# Patient Record
Sex: FEMALE
[De-identification: names, ages, dates, MRNs, and addresses within clinical notes are randomized per-mention and may not be internally consistent; named-entity substitution may affect disease eponyms.]

---

## 2019-07-11 ENCOUNTER — LABORATORY RESULT (OUTPATIENT)
Age: 69
End: 2019-07-11

## 2019-07-12 ENCOUNTER — NON-APPOINTMENT (OUTPATIENT)
Age: 69
End: 2019-07-12

## 2019-07-12 ENCOUNTER — APPOINTMENT (OUTPATIENT)
Dept: INTERNAL MEDICINE | Facility: CLINIC | Age: 69
End: 2019-07-12
Payer: COMMERCIAL

## 2019-07-12 VITALS
SYSTOLIC BLOOD PRESSURE: 106 MMHG | HEART RATE: 100 BPM | HEIGHT: 64.57 IN | BODY MASS INDEX: 18.55 KG/M2 | DIASTOLIC BLOOD PRESSURE: 68 MMHG | TEMPERATURE: 98.6 F | OXYGEN SATURATION: 98 % | WEIGHT: 110 LBS

## 2019-07-12 DIAGNOSIS — Z80.0 FAMILY HISTORY OF MALIGNANT NEOPLASM OF DIGESTIVE ORGANS: ICD-10-CM

## 2019-07-12 DIAGNOSIS — R42 DIZZINESS AND GIDDINESS: ICD-10-CM

## 2019-07-12 DIAGNOSIS — Z80.1 FAMILY HISTORY OF MALIGNANT NEOPLASM OF TRACHEA, BRONCHUS AND LUNG: ICD-10-CM

## 2019-07-12 DIAGNOSIS — Z87.891 PERSONAL HISTORY OF NICOTINE DEPENDENCE: ICD-10-CM

## 2019-07-12 DIAGNOSIS — F41.9 ANXIETY DISORDER, UNSPECIFIED: ICD-10-CM

## 2019-07-12 DIAGNOSIS — Z00.00 ENCOUNTER FOR GENERAL ADULT MEDICAL EXAMINATION W/OUT ABNORMAL FINDINGS: ICD-10-CM

## 2019-07-12 DIAGNOSIS — Z80.8 FAMILY HISTORY OF MALIGNANT NEOPLASM OF OTHER ORGANS OR SYSTEMS: ICD-10-CM

## 2019-07-12 PROCEDURE — 99387 INIT PM E/M NEW PAT 65+ YRS: CPT | Mod: 25

## 2019-07-12 PROCEDURE — 93000 ELECTROCARDIOGRAM COMPLETE: CPT

## 2019-07-12 PROCEDURE — 36415 COLL VENOUS BLD VENIPUNCTURE: CPT

## 2019-07-12 RX ORDER — ALPRAZOLAM 1 MG/1
1 TABLET ORAL
Refills: 0 | Status: ACTIVE | COMMUNITY
Start: 2019-07-12

## 2019-07-15 ENCOUNTER — OTHER (OUTPATIENT)
Age: 69
End: 2019-07-15

## 2019-07-15 LAB
25(OH)D3 SERPL-MCNC: 25.3 NG/ML
ALBUMIN SERPL ELPH-MCNC: 4.9 G/DL
ALP BLD-CCNC: 67 U/L
ALT SERPL-CCNC: 8 U/L
ANION GAP SERPL CALC-SCNC: 10 MMOL/L
APPEARANCE: CLEAR
AST SERPL-CCNC: 16 U/L
BASOPHILS # BLD AUTO: 0.03 K/UL
BASOPHILS NFR BLD AUTO: 0.5 %
BILIRUB SERPL-MCNC: 0.3 MG/DL
BILIRUBIN URINE: NEGATIVE
BLOOD URINE: ABNORMAL
BUN SERPL-MCNC: 19 MG/DL
CALCIUM SERPL-MCNC: 9.8 MG/DL
CHLORIDE SERPL-SCNC: 104 MMOL/L
CHOLEST SERPL-MCNC: 220 MG/DL
CHOLEST/HDLC SERPL: 2.4 RATIO
CO2 SERPL-SCNC: 29 MMOL/L
COLOR: YELLOW
CREAT SERPL-MCNC: 0.83 MG/DL
CRP SERPL-MCNC: <0.1 MG/DL
EOSINOPHIL # BLD AUTO: 0.1 K/UL
EOSINOPHIL NFR BLD AUTO: 1.7 %
ERYTHROCYTE [SEDIMENTATION RATE] IN BLOOD BY WESTERGREN METHOD: 2 MM/HR
ESTIMATED AVERAGE GLUCOSE: 103 MG/DL
GLUCOSE QUALITATIVE U: NEGATIVE
GLUCOSE SERPL-MCNC: 98 MG/DL
HBA1C MFR BLD HPLC: 5.2 %
HCT VFR BLD CALC: 39.3 %
HCV AB SER QL: NONREACTIVE
HCV S/CO RATIO: 0.13 S/CO
HDLC SERPL-MCNC: 90 MG/DL
HGB BLD-MCNC: 12.3 G/DL
IMM GRANULOCYTES NFR BLD AUTO: 0.2 %
KETONES URINE: NEGATIVE
LDLC SERPL CALC-MCNC: 115 MG/DL
LEUKOCYTE ESTERASE URINE: NEGATIVE
LYMPHOCYTES # BLD AUTO: 1.7 K/UL
LYMPHOCYTES NFR BLD AUTO: 29.7 %
MAN DIFF?: NORMAL
MCHC RBC-ENTMCNC: 31.3 GM/DL
MCHC RBC-ENTMCNC: 34.9 PG
MCV RBC AUTO: 111.6 FL
MONOCYTES # BLD AUTO: 0.48 K/UL
MONOCYTES NFR BLD AUTO: 8.4 %
NEUTROPHILS # BLD AUTO: 3.41 K/UL
NEUTROPHILS NFR BLD AUTO: 59.5 %
NITRITE URINE: NEGATIVE
PH URINE: 6
PLATELET # BLD AUTO: 270 K/UL
POTASSIUM SERPL-SCNC: 4.4 MMOL/L
PROT SERPL-MCNC: 6.9 G/DL
PROTEIN URINE: NORMAL
RBC # BLD: 3.52 M/UL
RBC # FLD: 14.2 %
RHEUMATOID FACT SER QL: <10 IU/ML
SODIUM SERPL-SCNC: 143 MMOL/L
SPECIFIC GRAVITY URINE: 1.03
TRIGL SERPL-MCNC: 77 MG/DL
UROBILINOGEN URINE: NORMAL
WBC # FLD AUTO: 5.73 K/UL

## 2019-07-16 LAB
ANA PAT FLD IF-IMP: ABNORMAL
ANA SER IF-ACNC: ABNORMAL

## 2023-04-12 ENCOUNTER — APPOINTMENT (OUTPATIENT)
Dept: NEUROLOGY | Facility: CLINIC | Age: 73
End: 2023-04-12
Payer: MEDICARE

## 2023-04-12 VITALS
BODY MASS INDEX: 19.81 KG/M2 | TEMPERATURE: 97.7 F | WEIGHT: 116 LBS | RESPIRATION RATE: 17 BRPM | SYSTOLIC BLOOD PRESSURE: 119 MMHG | HEIGHT: 64 IN | HEART RATE: 97 BPM | OXYGEN SATURATION: 99 % | DIASTOLIC BLOOD PRESSURE: 87 MMHG

## 2023-04-12 DIAGNOSIS — Z12.11 ENCOUNTER FOR SCREENING FOR MALIGNANT NEOPLASM OF COLON: ICD-10-CM

## 2023-04-12 DIAGNOSIS — Z13.220 ENCOUNTER FOR SCREENING FOR LIPOID DISORDERS: ICD-10-CM

## 2023-04-12 DIAGNOSIS — Z12.39 ENCOUNTER FOR OTHER SCREENING FOR MALIGNANT NEOPLASM OF BREAST: ICD-10-CM

## 2023-04-12 DIAGNOSIS — R07.89 OTHER CHEST PAIN: ICD-10-CM

## 2023-04-12 DIAGNOSIS — R91.8 OTHER NONSPECIFIC ABNORMAL FINDING OF LUNG FIELD: ICD-10-CM

## 2023-04-12 DIAGNOSIS — Z91.89 OTHER SPECIFIED PERSONAL RISK FACTORS, NOT ELSEWHERE CLASSIFIED: ICD-10-CM

## 2023-04-12 DIAGNOSIS — Z23 ENCOUNTER FOR IMMUNIZATION: ICD-10-CM

## 2023-04-12 DIAGNOSIS — M54.9 DORSALGIA, UNSPECIFIED: ICD-10-CM

## 2023-04-12 DIAGNOSIS — Z13.1 ENCOUNTER FOR SCREENING FOR DIABETES MELLITUS: ICD-10-CM

## 2023-04-12 PROCEDURE — 99204 OFFICE O/P NEW MOD 45 MIN: CPT

## 2023-04-12 NOTE — ASSESSMENT
[FreeTextEntry1] : 72-year-old woman with episodic sensation of movement/rocking when she is standing or walking.  I agree this is consistent with the description of mal de debarquement, ans is not consistent with BPPV or an intracranial lesion.  I would like to obtain a CT scan of the temporal bones to look for superior canal dehiscence, as this was not done after her prior MRI showing asymmetric superior canals.  If the CT is unremarkable, will recommend consultation with neuro-otologist Le Green at [facility].

## 2023-04-12 NOTE — PHYSICAL EXAM
[FreeTextEntry1] : Physical Exam\par Constitutional: no apparent distress\par Psychiatric: normal affect, euthymic, alert and oriented x 3\par \par Neurologic Exam:\par Mental Status: awake and alert, speech fluent and prosodic with no paraphasic errors\par Cranial Nerves: I: deferred; II: pupils equal round and reactive, visual fields full to confrontation, III, IV, VI: extraocular movements full with no nystagmus; V: facial sensation intact and symmetric; VII: facial power symmetric; VIII: hearing intact to finger rub; IX/X:no dysarthria; XI: shoulder shrug symmetric; XII: tongue protrudes midline\par Motor: normal bulk and tone, no orbiting or pronator drift, power 5/5 to confrontation throughout including shoulder abduction, elbow flexion and extension, wrist flexion and extension, hip flexion, knee flexion and extension, no abnormal movements\par Sensation: intact to light touch in distal upper and lower extremities bilaterally\par Coordination: finger-nose-finger intact bilaterally\par Reflexes: 2+ biceps, triceps, brachioradialis, patella\par Gait: narrow base, normal stance and stride, normal arm swing, difficulty with tandem gait\par

## 2023-04-12 NOTE — HISTORY OF PRESENT ILLNESS
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  Upon waking up after the surgery and trying to send up, she felt a rocking/moving sensation, as if she were on boat.  Since then she has had that same sensation every time she tries to stand or walk.  No symptoms when turning her head side to side, looking up or down, or rolling over in bed.  She was previously followed by another neurologist (now retired) who diagnosed her with mal de debarquement.  She takes Xanax PRN which she says calms her down when the symptoms are very severe but does not seem to help with the sensation of motion.  She has tried vestibular rehab which did not help.\par

## 2023-04-12 NOTE — DATA REVIEWED
[de-identified] : MRI brain with IACs 8/26/2021 independently reviewed\par 1. No acoustic schwannoma or posterior fossa mass is identified.\par 2. Chronic small vessel ischemic changes in the white matter is observed as well as a small chronic infarct in the right corona radiata.\par 3. 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 Suggest correlation with CT temporal bone study if there is clinical concern of superior semicircular canal dehiscence syndrome.

## 2023-04-15 ENCOUNTER — APPOINTMENT (OUTPATIENT)
Dept: CT IMAGING | Facility: CLINIC | Age: 73
End: 2023-04-15
Payer: MEDICARE

## 2023-04-15 ENCOUNTER — RESULT REVIEW (OUTPATIENT)
Age: 73
End: 2023-04-15

## 2023-04-15 ENCOUNTER — OUTPATIENT (OUTPATIENT)
Dept: OUTPATIENT SERVICES | Facility: HOSPITAL | Age: 73
LOS: 1 days | End: 2023-04-15

## 2023-04-15 PROCEDURE — 70480 CT ORBIT/EAR/FOSSA W/O DYE: CPT | Mod: 26

## 2023-04-20 ENCOUNTER — NON-APPOINTMENT (OUTPATIENT)
Age: 73
End: 2023-04-20

## 2023-04-25 ENCOUNTER — APPOINTMENT (OUTPATIENT)
Dept: OTOLARYNGOLOGY | Facility: CLINIC | Age: 73
End: 2023-04-25
Payer: MEDICARE

## 2023-04-25 VITALS
RESPIRATION RATE: 17 BRPM | HEART RATE: 80 BPM | TEMPERATURE: 97.9 F | OXYGEN SATURATION: 98 % | BODY MASS INDEX: 19.81 KG/M2 | SYSTOLIC BLOOD PRESSURE: 118 MMHG | HEIGHT: 64 IN | DIASTOLIC BLOOD PRESSURE: 80 MMHG | WEIGHT: 116 LBS

## 2023-04-25 DIAGNOSIS — R42 DIZZINESS AND GIDDINESS: ICD-10-CM

## 2023-04-25 DIAGNOSIS — Z80.9 FAMILY HISTORY OF MALIGNANT NEOPLASM, UNSPECIFIED: ICD-10-CM

## 2023-04-25 PROCEDURE — 99203 OFFICE O/P NEW LOW 30 MIN: CPT

## 2023-04-26 NOTE — HISTORY OF PRESENT ILLNESS
[de-identified] : 73 y/o F is presenting with disequilibrium for the past 6 years. 6 years ago she had kidney stones and had lithotripsy. When woke up from anesthesia she felt unsteady. She denies vertigo but saw neurologist and was told mal debarquement. She has never been on a boat. She saw Dr Arellano and was told to get a ct scan and was read as  l sscd. When she walks she feels she is in the middle of a row boat or as if she is on a trampoline. She denies sensation where the room is spinning. She denies hearing loss. She has occasional intermittent tinnitus. She is c/o l ear pressure. When she hears loud noises, she reports disequilibrium is somewhat worse. Former smoker. She reports she has seen a neurotologist in the past and had vestibular testing but she brought no records. No fh relevant to cc.

## 2023-04-26 NOTE — DATA REVIEWED
[de-identified] : CT iacs from 4/15/23 revealed l sscd- images and report reviewed with pt; images are not absolutely clear

## 2023-04-26 NOTE — ASSESSMENT
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

## 2023-05-09 ENCOUNTER — APPOINTMENT (OUTPATIENT)
Dept: OTOLARYNGOLOGY | Facility: CLINIC | Age: 73
End: 2023-05-09
Payer: MEDICARE

## 2023-05-09 VITALS
TEMPERATURE: 98 F | DIASTOLIC BLOOD PRESSURE: 75 MMHG | WEIGHT: 116 LBS | OXYGEN SATURATION: 99 % | BODY MASS INDEX: 19.81 KG/M2 | HEART RATE: 94 BPM | SYSTOLIC BLOOD PRESSURE: 113 MMHG | HEIGHT: 64 IN

## 2023-05-09 PROCEDURE — 99213 OFFICE O/P EST LOW 20 MIN: CPT

## 2023-05-10 NOTE — HISTORY OF PRESENT ILLNESS
[de-identified] : 2 week followup visit for this 71 y/o F with disequilibrium for the past 6 years after she had lithotripsy. She feels as if she is on a boat when she walks. She had CT scan which revealed l sscd. She has had vestibular testing in the past and her medical records were requested at last visit but we have not received them. She has not had MRI. When she shakes her head she hears a "whistle" in her left ear. She states she was told her issue was with her r ear by her last ent but she knows it is her left.

## 2023-05-10 NOTE — ASSESSMENT
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

## 2023-06-01 ENCOUNTER — NON-APPOINTMENT (OUTPATIENT)
Age: 73
End: 2023-06-01

## 2023-06-07 ENCOUNTER — APPOINTMENT (OUTPATIENT)
Dept: OTOLARYNGOLOGY | Facility: CLINIC | Age: 73
End: 2023-06-07
Payer: MEDICARE

## 2023-06-07 VITALS
BODY MASS INDEX: 19.81 KG/M2 | TEMPERATURE: 98 F | WEIGHT: 116 LBS | DIASTOLIC BLOOD PRESSURE: 83 MMHG | HEIGHT: 64 IN | HEART RATE: 92 BPM | OXYGEN SATURATION: 95 % | SYSTOLIC BLOOD PRESSURE: 118 MMHG

## 2023-06-07 PROCEDURE — 99213 OFFICE O/P EST LOW 20 MIN: CPT

## 2023-06-07 PROCEDURE — 92517 VEMP TEST I&R CERVICAL: CPT

## 2023-06-07 NOTE — ASSESSMENT
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 Steward as she has seen him before - she did not want to do this\par asked to RTC with VNG and MRI to review findings at her earliest convenience. explained this is not sscd but I do not know what the cause is at this point.\par \par

## 2023-06-07 NOTE — HISTORY OF PRESENT ILLNESS
[de-identified] : 1 month followup visit for this 71 y/o F with disequilibrium for the past 6 years after she had lithotripsy. She feels as if she is on a boat when she walks. She had CT scan which revealed l sscd. She is here to review vemp. She is scheduled for MRI tomorrow. She did not have VNG. She had dental implant placed a week ago and feels her face is swollen. She saw her dentist yesterday who recommended weekly follow ups- we offered to have her go to the hospital and she declined . Face does not appear swollen and she is afebrile.

## 2023-06-08 ENCOUNTER — APPOINTMENT (OUTPATIENT)
Dept: OTOLARYNGOLOGY | Facility: CLINIC | Age: 73
End: 2023-06-08
Payer: MEDICARE

## 2023-06-08 VITALS
TEMPERATURE: 97.1 F | OXYGEN SATURATION: 99 % | HEART RATE: 77 BPM | DIASTOLIC BLOOD PRESSURE: 87 MMHG | SYSTOLIC BLOOD PRESSURE: 125 MMHG | RESPIRATION RATE: 13 BRPM

## 2023-06-08 PROCEDURE — 99213 OFFICE O/P EST LOW 20 MIN: CPT

## 2023-06-08 NOTE — DATA REVIEWED
[de-identified] : MRI revealed chronic white matter changes, chronic lacunar infarct otherwise unremarkable -images and report reviewed with pt

## 2023-06-08 NOTE — HISTORY OF PRESENT ILLNESS
[de-identified] : 1 day followup visit for this 71 y/o F with disequilibrium for the past 6 years after she had lithotripsy. She feels as if she is on a boat when she walks. She had CT scan which revealed l sscd. She had vemp which was nl. She is here to review MRI. She has not had VNG as of yet.

## 2023-06-08 NOTE — ASSESSMENT
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 Arellano\par -vemp nl - results reviewed with pt\par -recommended repeating VNG as her outside test had incorrect unit and it identifies side opposite from the one she feels is the problem\par RTC with VNG to review findings

## 2023-08-07 ENCOUNTER — APPOINTMENT (OUTPATIENT)
Dept: OTOLARYNGOLOGY | Facility: CLINIC | Age: 73
End: 2023-08-07
Payer: MEDICARE

## 2023-08-07 VITALS
SYSTOLIC BLOOD PRESSURE: 127 MMHG | OXYGEN SATURATION: 99 % | HEART RATE: 84 BPM | TEMPERATURE: 97.3 F | DIASTOLIC BLOOD PRESSURE: 83 MMHG | HEIGHT: 65 IN | BODY MASS INDEX: 19.33 KG/M2 | WEIGHT: 116 LBS

## 2023-08-07 PROCEDURE — 92540 BASIC VESTIBULAR EVALUATION: CPT

## 2023-08-07 PROCEDURE — 92537 CALORIC VSTBLR TEST W/REC: CPT

## 2023-08-07 PROCEDURE — 99213 OFFICE O/P EST LOW 20 MIN: CPT

## 2023-08-07 NOTE — ASSESSMENT
[FreeTextEntry1] : disequilibrium -VNG revealed clinically significant nystagmus in BODY right (7 deg/sec), low level nystagmus in several positions -results reviewed with pt; explained aging is a common cause of this finding -vemp test nl  -MRI neck to r/o cervical vertigo -recommended followup with neurologist as her vng finding is very mild- she initially saw Dr. Arellano  RTC with MRI neck --> if MRI is ok consider vestibular rehab

## 2023-08-07 NOTE — HISTORY OF PRESENT ILLNESS
[de-identified] : 2 month followup visit for this 71 y/o F with disequilibrium for the past 6 years after she had lithotripsy. She feels as if she is on a boat when she walks. She had CT scan which revealed l sscd. However, her vemp test was nl. She is here to review VNG. She is upset about her loss of balance but has a therapist who she speaks with regularly.

## 2023-08-07 NOTE — DATA REVIEWED
[de-identified] : VNG revealed clinically significant nystagmus in BODY right (7 deg/sec), low level nystagmus in several positions -results reviewed with pt

## 2023-08-18 ENCOUNTER — APPOINTMENT (OUTPATIENT)
Dept: OTOLARYNGOLOGY | Facility: CLINIC | Age: 73
End: 2023-08-18
Payer: MEDICARE

## 2023-08-18 VITALS
DIASTOLIC BLOOD PRESSURE: 67 MMHG | OXYGEN SATURATION: 95 % | TEMPERATURE: 97 F | BODY MASS INDEX: 19.33 KG/M2 | SYSTOLIC BLOOD PRESSURE: 122 MMHG | HEIGHT: 65 IN | WEIGHT: 116 LBS | HEART RATE: 87 BPM

## 2023-08-18 PROCEDURE — 99213 OFFICE O/P EST LOW 20 MIN: CPT

## 2023-08-19 NOTE — ASSESSMENT
[FreeTextEntry1] : reassured about mri results thyroid test ordered due to atrophic thyroid seen on mri will call pt with result vestibular rehab ordered rtc 3 mo

## 2023-08-19 NOTE — HISTORY OF PRESENT ILLNESS
[de-identified] : 111 d follow up appt for this 71 yo f with disequilibrium. She had mri of neck performed to r/o cervical vertigo and is here to review results.

## 2023-08-19 NOTE — DATA REVIEWED
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

## 2023-08-21 ENCOUNTER — NON-APPOINTMENT (OUTPATIENT)
Age: 73
End: 2023-08-21

## 2023-08-21 LAB — TSH SERPL-ACNC: 2.78 UIU/ML

## 2023-08-22 ENCOUNTER — NON-APPOINTMENT (OUTPATIENT)
Age: 73
End: 2023-08-22

## 2023-08-23 ENCOUNTER — NON-APPOINTMENT (OUTPATIENT)
Age: 73
End: 2023-08-23

## 2023-11-17 ENCOUNTER — APPOINTMENT (OUTPATIENT)
Dept: OTOLARYNGOLOGY | Facility: CLINIC | Age: 73
End: 2023-11-17
Payer: MEDICARE

## 2023-11-17 VITALS — SYSTOLIC BLOOD PRESSURE: 103 MMHG | HEART RATE: 77 BPM | OXYGEN SATURATION: 99 % | DIASTOLIC BLOOD PRESSURE: 72 MMHG

## 2023-11-17 DIAGNOSIS — R42 DIZZINESS AND GIDDINESS: ICD-10-CM

## 2023-11-17 PROCEDURE — 99212 OFFICE O/P EST SF 10 MIN: CPT

## 2023-11-18 PROBLEM — R42 DISEQUILIBRIUM: Status: ACTIVE | Noted: 2023-04-25
